# Patient Record
Sex: MALE
[De-identification: names, ages, dates, MRNs, and addresses within clinical notes are randomized per-mention and may not be internally consistent; named-entity substitution may affect disease eponyms.]

---

## 2023-12-27 PROBLEM — Z00.00 ENCOUNTER FOR PREVENTIVE HEALTH EXAMINATION: Status: ACTIVE | Noted: 2023-12-27

## 2023-12-28 ENCOUNTER — APPOINTMENT (OUTPATIENT)
Dept: ORTHOPEDIC SURGERY | Facility: CLINIC | Age: 43
End: 2023-12-28
Payer: MEDICAID

## 2023-12-28 VITALS
HEIGHT: 66 IN | BODY MASS INDEX: 31.82 KG/M2 | HEART RATE: 80 BPM | WEIGHT: 198 LBS | SYSTOLIC BLOOD PRESSURE: 118 MMHG | DIASTOLIC BLOOD PRESSURE: 76 MMHG

## 2023-12-28 PROCEDURE — 99203 OFFICE O/P NEW LOW 30 MIN: CPT

## 2023-12-28 RX ORDER — GLIPIZIDE 10 MG/1
10 TABLET ORAL
Refills: 0 | Status: ACTIVE | COMMUNITY

## 2023-12-28 RX ORDER — METFORMIN HYDROCHLORIDE 1000 MG/1
1000 TABLET, COATED ORAL
Refills: 0 | Status: ACTIVE | COMMUNITY

## 2023-12-28 NOTE — HISTORY OF PRESENT ILLNESS
[Rest] : relieved by rest [Acetaminophen] : relieved by acetaminophen [de-identified] : This right-handed 43-year-old man presents for evaluation of a right middle finger injury.  He works as an  and was injured on December 26, 2023.  At that time he was drilling metal object and the drill spun on him.  He sustained an injury to his right middle finger.  He noticed that his finger was bent and was evaluated at urgent care.  X-rays were done and he was placed in a splint.  He states he was seen by an orthopedic surgeon.  There is no prior history of right hand or finger pain or injury.  He denies numbness or tingling in his upper extremities.  The patient's past medical history, past surgical history, medications, allergies, and social history were reviewed by me today with the patient and documented accordingly. In addition, the patient's family history, which is noncontributory to this visit, was also reviewed.   [de-identified] : moving finger exacerbates pain

## 2023-12-28 NOTE — DISCUSSION/SUMMARY
[de-identified] : The patient has a mallet finger injury to his right long finger.  I have discussed the pathology, natural history and treatment options with him.  He is placed in a hyperextension brace for the DIP joint.  He understands that this brace must be maintained at all times.  He will be reevaluated in 6 weeks.  He understands that despite appropriate splinting the close treatment may not succeed and the deformity may recur.  He is in agreement with the plan.

## 2023-12-28 NOTE — PHYSICAL EXAM
Left message to call back.     Per Dr. Garcia does she mean surgical decompression, how does she know it's not covered if she hasn't seen a surgeon, is this something the chiropractor would do in his office, need more details on the treatment.    [Rad] : radial 2+ and symmetric bilaterally [Normal] : Alert and in no acute distress [Poor Appearance] : well-appearing [Acute Distress] : not in acute distress [Obese] : not obese [de-identified] : The patient has no respiratory distress. Mood and affect are normal. The patient is alert and oriented to person, place and time. Examination of the cervical spine demonstrates no tenderness, no deformity and no muscle spasm. Cervical spine rotation is 60 to the right, 60 to the left, 75 of extension and 45 of flexion. Neurologic exam of the upper extremities reveals intact sensation to light touch. Motor function is 5 over 5 in all groups. Deep tendon reflexes are 2+ and equal at the biceps, triceps and brachioradialis. Examination of the right shoulder demonstrates no swelling, no deformity and no tenderness. The shoulder is stable. Drop arm test is negative. [place] test is negative. [place] test is negative. Motor strength is 5 over 5 in all groups. Range of motion is full and identical to that of the left shoulder. Flexion is 160, abduction 160, external rotation 45 and internal rotation to the lower thoracic level. There is no pain with motion of the elbows.  There is no tenderness of either elbow.  Examination of the right hand demonstrates mallet deformity of the long finger.  He has full passive extension of the long finger DIP but does not have active extension.  The other fingers are nontender. [de-identified] : SITE PERFORMED: [facility] PHONE: [phone] Patient: TAMMY WRIGHT YOB: 1980 Phone: [phone] MRN: [id number] Acc: [id number] Date of Exam: 12-   EXAM:  X-RAY RIGHT FINGERS MINIMUM 2 VIEWS  HISTORY: Third digit pain after injury.  TECHNIQUE: PA, oblique, and lateral views of the right third digit.   COMPARISON:  None available.  IMPRESSION: Nonspecific soft tissue swelling in the volar aspect of the third digit. No radiopaque foreign body. No evidence of an acute fracture.   No acute subluxation. Joint spaces are preserved.

## 2024-01-03 ENCOUNTER — APPOINTMENT (OUTPATIENT)
Dept: ORTHOPEDIC SURGERY | Facility: CLINIC | Age: 44
End: 2024-01-03

## 2024-02-22 ENCOUNTER — APPOINTMENT (OUTPATIENT)
Dept: ORTHOPEDIC SURGERY | Facility: CLINIC | Age: 44
End: 2024-02-22
Payer: MEDICAID

## 2024-02-22 VITALS — WEIGHT: 198 LBS | BODY MASS INDEX: 31.82 KG/M2 | HEIGHT: 66 IN

## 2024-02-22 PROCEDURE — 99213 OFFICE O/P EST LOW 20 MIN: CPT

## 2024-02-22 NOTE — HISTORY OF PRESENT ILLNESS
[de-identified] : The patient presents for right long finger mallet finger injury. He has been in a hyperextension brace and has been consistent with use. He still has pain over the DIP. He takes Motrin and Tylenol with good relief.

## 2024-02-22 NOTE — DISCUSSION/SUMMARY
[de-identified] : The patient's right long finger DIP is held in extension.  This demonstrates healing of the extensor tendon.  He will start on range of motion exercises at home.  He will discontinue the use of full-time splinting and will use the splint only at night.  He will be reevaluated in 3 weeks.  If the mallet finger recurs he will be referred to the hand service.

## 2024-02-22 NOTE — PHYSICAL EXAM
[Rad] : radial 2+ and symmetric bilaterally [Normal] : Alert and in no acute distress [Poor Appearance] : well-appearing [Acute Distress] : not in acute distress [Obese] : not obese [de-identified] : The patient has no respiratory distress. Mood and affect are normal. The patient is alert and oriented to person, place and time. Examination of the cervical spine demonstrates no tenderness, no deformity and no muscle spasm. Cervical spine rotation is 60 to the right, 60 to the left, 75 of extension and 45 of flexion. Neurologic exam of the upper extremities reveals intact sensation to light touch. Motor function is 5 over 5 in all groups. Deep tendon reflexes are 2+ and equal at the biceps, triceps and brachioradialis. Examination of the right shoulder demonstrates no swelling, no deformity and no tenderness. The shoulder is stable. Drop arm test is negative. [place] test is negative. Liftoff test is negative. Motor strength is 5 over 5 in all groups. Range of motion is full and identical to that of the left shoulder. Flexion is 160, abduction 160, external rotation 45 and internal rotation to the lower thoracic level. There is no pain with motion of the elbows.  There is no tenderness of either elbow.  Examination of the right hand demonstrates no mallet deformity of the long finger.  He exhibits full extension of the DIP with only 15 degrees of flexion.  The skin is intact.

## 2024-02-23 ENCOUNTER — APPOINTMENT (OUTPATIENT)
Dept: ORTHOPEDIC SURGERY | Facility: CLINIC | Age: 44
End: 2024-02-23

## 2024-03-15 ENCOUNTER — APPOINTMENT (OUTPATIENT)
Dept: ORTHOPEDIC SURGERY | Facility: CLINIC | Age: 44
End: 2024-03-15
Payer: MEDICAID

## 2024-03-15 VITALS
BODY MASS INDEX: 30.86 KG/M2 | DIASTOLIC BLOOD PRESSURE: 78 MMHG | HEART RATE: 82 BPM | WEIGHT: 192 LBS | SYSTOLIC BLOOD PRESSURE: 114 MMHG | HEIGHT: 66 IN

## 2024-03-15 DIAGNOSIS — M20.011 MALLET FINGER OF RIGHT FINGER(S): ICD-10-CM

## 2024-03-15 PROCEDURE — 99213 OFFICE O/P EST LOW 20 MIN: CPT

## 2024-03-15 NOTE — DISCUSSION/SUMMARY
[de-identified] : The patient's right long mallet finger has healed well.  He does have stiffness at the PIP and DIP joints.  He will work on range of motion exercises at home.  He will return as needed.

## 2024-03-15 NOTE — HISTORY OF PRESENT ILLNESS
[de-identified] : The patient presents for right long finger mallet finger injury. He has been in a hyperextension brace and has been consistent with use. He still has pain over the DIP.

## 2024-03-15 NOTE — PHYSICAL EXAM
[Rad] : radial 2+ and symmetric bilaterally [Normal] : Alert and in no acute distress [Poor Appearance] : well-appearing [Acute Distress] : not in acute distress [Obese] : not obese [de-identified] : The patient has no respiratory distress. Mood and affect are normal. The patient is alert and oriented to person, place and time. Examination of the cervical spine demonstrates no tenderness, no deformity and no muscle spasm. Cervical spine rotation is 60 to the right, 60 to the left, 75 of extension and 45 of flexion. Neurologic exam of the upper extremities reveals intact sensation to light touch. Motor function is 5 over 5 in all groups. Deep tendon reflexes are 2+ and equal at the biceps, triceps and brachioradialis. Examination of the right shoulder demonstrates no swelling, no deformity and no tenderness. The shoulder is stable. Drop arm test is negative. [place] test is negative. Liftoff test is negative. Motor strength is 5 over 5 in all groups. Range of motion is full and identical to that of the left shoulder. Flexion is 160, abduction 160, external rotation 45 and internal rotation to the lower thoracic level. There is no pain with motion of the elbows.  There is no tenderness of either elbow.  Examination of the right hand demonstrates no mallet deformity of the long finger.  He exhibits full extension of the DIP with only 20 degrees of flexion.  The skin is intact.